# Patient Record
Sex: MALE | Race: WHITE | NOT HISPANIC OR LATINO | Employment: FULL TIME | URBAN - METROPOLITAN AREA
[De-identification: names, ages, dates, MRNs, and addresses within clinical notes are randomized per-mention and may not be internally consistent; named-entity substitution may affect disease eponyms.]

---

## 2024-01-19 ENCOUNTER — HOSPITAL ENCOUNTER (EMERGENCY)
Facility: HOSPITAL | Age: 39
Discharge: HOME | End: 2024-01-19
Attending: EMERGENCY MEDICINE
Payer: MEDICAID

## 2024-01-19 VITALS
DIASTOLIC BLOOD PRESSURE: 103 MMHG | OXYGEN SATURATION: 100 % | BODY MASS INDEX: 28.13 KG/M2 | SYSTOLIC BLOOD PRESSURE: 140 MMHG | RESPIRATION RATE: 18 BRPM | WEIGHT: 158.75 LBS | HEIGHT: 63 IN | TEMPERATURE: 98 F | HEART RATE: 93 BPM

## 2024-01-19 DIAGNOSIS — R31.9 HEMATURIA, UNSPECIFIED TYPE: ICD-10-CM

## 2024-01-19 DIAGNOSIS — R10.9 RIGHT FLANK PAIN: Primary | ICD-10-CM

## 2024-01-19 LAB
ALBUMIN SERPL BCP-MCNC: 4.3 G/DL (ref 3.5–5.2)
ALP SERPL-CCNC: 43 U/L (ref 55–135)
ALT SERPL W/O P-5'-P-CCNC: 18 U/L (ref 10–44)
ANION GAP SERPL CALC-SCNC: 8 MMOL/L (ref 8–16)
AST SERPL-CCNC: 18 U/L (ref 10–40)
BASOPHILS # BLD AUTO: 0.01 K/UL (ref 0–0.2)
BASOPHILS NFR BLD: 0.2 % (ref 0–1.9)
BILIRUB SERPL-MCNC: 0.5 MG/DL (ref 0.1–1)
BILIRUB UR QL STRIP: NEGATIVE
BUN SERPL-MCNC: 10 MG/DL (ref 6–20)
CALCIUM SERPL-MCNC: 9.7 MG/DL (ref 8.7–10.5)
CHLORIDE SERPL-SCNC: 106 MMOL/L (ref 95–110)
CLARITY UR REFRACT.AUTO: CLEAR
CO2 SERPL-SCNC: 26 MMOL/L (ref 23–29)
COLOR UR AUTO: YELLOW
CREAT SERPL-MCNC: 0.8 MG/DL (ref 0.5–1.4)
DIFFERENTIAL METHOD BLD: NORMAL
EOSINOPHIL # BLD AUTO: 0.1 K/UL (ref 0–0.5)
EOSINOPHIL NFR BLD: 1.8 % (ref 0–8)
ERYTHROCYTE [DISTWIDTH] IN BLOOD BY AUTOMATED COUNT: 12.6 % (ref 11.5–14.5)
EST. GFR  (NO RACE VARIABLE): >60 ML/MIN/1.73 M^2
GLUCOSE SERPL-MCNC: 99 MG/DL (ref 70–110)
GLUCOSE UR QL STRIP: NEGATIVE
HCT VFR BLD AUTO: 44 % (ref 40–54)
HGB BLD-MCNC: 15 G/DL (ref 14–18)
HGB UR QL STRIP: ABNORMAL
IMM GRANULOCYTES # BLD AUTO: 0.01 K/UL (ref 0–0.04)
IMM GRANULOCYTES NFR BLD AUTO: 0.2 % (ref 0–0.5)
KETONES UR QL STRIP: NEGATIVE
LEUKOCYTE ESTERASE UR QL STRIP: NEGATIVE
LYMPHOCYTES # BLD AUTO: 1.8 K/UL (ref 1–4.8)
LYMPHOCYTES NFR BLD: 38.8 % (ref 18–48)
MCH RBC QN AUTO: 29.5 PG (ref 27–31)
MCHC RBC AUTO-ENTMCNC: 34.1 G/DL (ref 32–36)
MCV RBC AUTO: 87 FL (ref 82–98)
MONOCYTES # BLD AUTO: 0.4 K/UL (ref 0.3–1)
MONOCYTES NFR BLD: 8.6 % (ref 4–15)
NEUTROPHILS # BLD AUTO: 2.3 K/UL (ref 1.8–7.7)
NEUTROPHILS NFR BLD: 50.4 % (ref 38–73)
NITRITE UR QL STRIP: NEGATIVE
NRBC BLD-RTO: 0 /100 WBC
PH UR STRIP: 7 [PH] (ref 5–8)
PLATELET # BLD AUTO: 220 K/UL (ref 150–450)
PMV BLD AUTO: 10.2 FL (ref 9.2–12.9)
POTASSIUM SERPL-SCNC: 3.6 MMOL/L (ref 3.5–5.1)
PROT SERPL-MCNC: 6.6 G/DL (ref 6–8.4)
PROT UR QL STRIP: NEGATIVE
RBC # BLD AUTO: 5.08 M/UL (ref 4.6–6.2)
SODIUM SERPL-SCNC: 140 MMOL/L (ref 136–145)
SP GR UR STRIP: 1.01 (ref 1–1.03)
URN SPEC COLLECT METH UR: ABNORMAL
UROBILINOGEN UR STRIP-ACNC: NEGATIVE EU/DL
WBC # BLD AUTO: 4.51 K/UL (ref 3.9–12.7)

## 2024-01-19 PROCEDURE — 85025 COMPLETE CBC W/AUTO DIFF WBC: CPT | Mod: ER | Performed by: NURSE PRACTITIONER

## 2024-01-19 PROCEDURE — 99284 EMERGENCY DEPT VISIT MOD MDM: CPT | Mod: 25,ER

## 2024-01-19 PROCEDURE — 81003 URINALYSIS AUTO W/O SCOPE: CPT | Mod: ER | Performed by: NURSE PRACTITIONER

## 2024-01-19 PROCEDURE — 87389 HIV-1 AG W/HIV-1&-2 AB AG IA: CPT | Performed by: EMERGENCY MEDICINE

## 2024-01-19 PROCEDURE — 86803 HEPATITIS C AB TEST: CPT | Performed by: EMERGENCY MEDICINE

## 2024-01-19 PROCEDURE — 80053 COMPREHEN METABOLIC PANEL: CPT | Mod: ER | Performed by: NURSE PRACTITIONER

## 2024-01-19 RX ORDER — NAPROXEN 500 MG/1
500 TABLET ORAL 2 TIMES DAILY WITH MEALS
Qty: 10 TABLET | Refills: 0 | Status: SHIPPED | OUTPATIENT
Start: 2024-01-19 | End: 2024-01-24

## 2024-01-19 NOTE — ED PROVIDER NOTES
Encounter Date: 1/19/2024       History     Chief Complaint   Patient presents with    Hematuria     Dark and light color blood noted in urine, R side flank pain, onset 1 week ago      Patient presents to ER for hematuria, onset 1 week ago.  Associated symptoms include right flank pain.  Symptoms have been intermittent since onset.  He has tried nothing for the symptoms.  Denies trauma/injury, nausea, vomiting, abdominal pain, fever, dysuria, diarrhea, constipation, weakness, fatigue.    The history is provided by the patient.     Review of patient's allergies indicates:   Allergen Reactions    Hydrocodone-acetaminophen      No past medical history on file.  No past surgical history on file.  No family history on file.     Review of Systems   Constitutional:  Negative for chills, fatigue and fever.   HENT:  Negative for ear pain.    Eyes:  Negative for pain.   Respiratory:  Negative for cough and shortness of breath.    Cardiovascular:  Negative for chest pain.   Gastrointestinal:  Negative for abdominal pain, constipation, diarrhea, nausea and vomiting.   Genitourinary:  Positive for flank pain and hematuria. Negative for dysuria.   Musculoskeletal:  Negative for back pain, myalgias and neck pain.   Skin:  Negative for rash.   Neurological:  Negative for weakness and headaches.       Physical Exam     Initial Vitals [01/19/24 1431]   BP Pulse Resp Temp SpO2   (!) 140/103 93 18 97.9 °F (36.6 °C) 100 %      MAP       --         Physical Exam    Nursing note and vitals reviewed.  Constitutional: He appears well-developed and well-nourished. He is not diaphoretic. He is cooperative.  Non-toxic appearance. He does not have a sickly appearance. He does not appear ill. No distress.   HENT:   Head: Normocephalic and atraumatic.   Mouth/Throat: Oropharynx is clear and moist.   Eyes: Conjunctivae are normal.   Neck: Neck supple.   Normal range of motion.  Cardiovascular:  Normal rate, regular rhythm and intact distal pulses.            Pulmonary/Chest: Breath sounds normal. No respiratory distress.   Abdominal: Abdomen is soft. Bowel sounds are normal. He exhibits no distension. There is no abdominal tenderness.   No CVA tenderness.  There is no abdominal tenderness upon light or deep palpation. There is no guarding.   Musculoskeletal:         General: Normal range of motion.      Cervical back: Normal range of motion and neck supple.     Neurological: He is alert and oriented to person, place, and time. He has normal strength. GCS score is 15. GCS eye subscore is 4. GCS verbal subscore is 5. GCS motor subscore is 6.   Skin: Skin is warm and dry. Capillary refill takes less than 2 seconds.         ED Course   Procedures  Labs Reviewed   COMPREHENSIVE METABOLIC PANEL - Abnormal; Notable for the following components:       Result Value    Alkaline Phosphatase 43 (*)     All other components within normal limits    Narrative:     Release to patient->Immediate   URINALYSIS, REFLEX TO URINE CULTURE - Abnormal; Notable for the following components:    Occult Blood UA Trace (*)     All other components within normal limits    Narrative:     Specimen Source->Urine   CBC W/ AUTO DIFFERENTIAL    Narrative:     Release to patient->Immediate   HIV 1 / 2 ANTIBODY   HEPATITIS C ANTIBODY   HEP C VIRUS HOLD SPECIMEN        Results for orders placed or performed during the hospital encounter of 01/19/24   CBC auto differential   Result Value Ref Range    WBC 4.51 3.90 - 12.70 K/uL    RBC 5.08 4.60 - 6.20 M/uL    Hemoglobin 15.0 14.0 - 18.0 g/dL    Hematocrit 44.0 40.0 - 54.0 %    MCV 87 82 - 98 fL    MCH 29.5 27.0 - 31.0 pg    MCHC 34.1 32.0 - 36.0 g/dL    RDW 12.6 11.5 - 14.5 %    Platelets 220 150 - 450 K/uL    MPV 10.2 9.2 - 12.9 fL    Immature Granulocytes 0.2 0.0 - 0.5 %    Gran # (ANC) 2.3 1.8 - 7.7 K/uL    Immature Grans (Abs) 0.01 0.00 - 0.04 K/uL    Lymph # 1.8 1.0 - 4.8 K/uL    Mono # 0.4 0.3 - 1.0 K/uL    Eos # 0.1 0.0 - 0.5 K/uL    Baso #  0.01 0.00 - 0.20 K/uL    nRBC 0 0 /100 WBC    Gran % 50.4 38.0 - 73.0 %    Lymph % 38.8 18.0 - 48.0 %    Mono % 8.6 4.0 - 15.0 %    Eosinophil % 1.8 0.0 - 8.0 %    Basophil % 0.2 0.0 - 1.9 %    Differential Method Automated    Comprehensive metabolic panel   Result Value Ref Range    Sodium 140 136 - 145 mmol/L    Potassium 3.6 3.5 - 5.1 mmol/L    Chloride 106 95 - 110 mmol/L    CO2 26 23 - 29 mmol/L    Glucose 99 70 - 110 mg/dL    BUN 10 6 - 20 mg/dL    Creatinine 0.8 0.5 - 1.4 mg/dL    Calcium 9.7 8.7 - 10.5 mg/dL    Total Protein 6.6 6.0 - 8.4 g/dL    Albumin 4.3 3.5 - 5.2 g/dL    Total Bilirubin 0.5 0.1 - 1.0 mg/dL    Alkaline Phosphatase 43 (L) 55 - 135 U/L    AST 18 10 - 40 U/L    ALT 18 10 - 44 U/L    eGFR >60.0 >60 mL/min/1.73 m^2    Anion Gap 8 8 - 16 mmol/L   Urinalysis, Reflex to Urine Culture Urine, Clean Catch    Specimen: Urine   Result Value Ref Range    Specimen UA Urine, Clean Catch     Color, UA Yellow Yellow, Straw, Corrine    Appearance, UA Clear Clear    pH, UA 7.0 5.0 - 8.0    Specific Gravity, UA 1.015 1.005 - 1.030    Protein, UA Negative Negative    Glucose, UA Negative Negative    Ketones, UA Negative Negative    Bilirubin (UA) Negative Negative    Occult Blood UA Trace (A) Negative    Nitrite, UA Negative Negative    Urobilinogen, UA Negative <2.0 EU/dL    Leukocytes, UA Negative Negative         Imaging Results              CT Renal Stone Study ABD Pelvis WO (Final result)  Result time 01/19/24 15:06:25      Final result by KRUNAL Rivers Sr., MD (01/19/24 15:06:25)                   Impression:      1. The kidneys are normal in appearance.  There is no nephrolithiasis visualized.  2. There are findings characteristic of mesenteric adenitis.  All CT scans at this facility use dose modulation, iterative reconstruction, and/or weight base dosing when appropriate to reduce radiation dose when appropriate to reduce radiation dose to as low as reasonably achievable.      Electronically signed  by: Serg Rivers MD  Date:    01/19/2024  Time:    15:06               Narrative:    EXAMINATION:  CT RENAL STONE STUDY ABD PELVIS WO    CLINICAL HISTORY:  Flank pain, kidney stone suspected;right flank pain;    TECHNIQUE:  Standard abdomen and pelvis CT protocol without IV contrast was performed    COMPARISON:  None    FINDINGS:  Finding: The size of the heart is within normal limits. The lungs are clear. There is no pneumothorax or pleural effusion.    The liver, gallbladder, pancreas, spleen, adrenals, and kidneys are normal in appearance. The ureters and the urinary bladder are normal in appearance.  There is a mild amount of calcification within the prostate.  The appendix and the rest of the gastrointestinal system are normal in appearance.  There is a mild amount of haziness in the mesenteric fat of the abdomen and pelvis.  There are prominent size mesenteric lymph nodes.  There is no free fluid within the abdomen or pelvis. There is no pneumoperitoneum.                                       Medications - No data to display  Medical Decision Making  Amount and/or Complexity of Data Reviewed  Labs: ordered.  Radiology: ordered.    Risk  Prescription drug management.                All results reviewed and discussed with patient he verbalized understanding with no further concerns.  Abdominal examination unremarkable.  No abdominal tenderness upon light or deep palpation.  No nausea or vomiting.  Afebrile.  He is nontoxic/non ill-appearing.  He has in no acute distress.  Discussed outpatient follow-up with urology services, contact information provided on discharge forms and ambulatory referral sent at the time of discharge.  Discussed signs and symptoms to return to ER.  Patient agrees with this plan and voiced no further concerns.  I discussed with patient  that evaluation in the ED does not suggest any emergent or life threatening medical conditions requiring immediate intervention beyond what was  provided in the ED, and I believe patient is safe for discharge. Regardless, an unremarkable evaluation in the ED does not preclude the development or presence of a serious of life threatening condition. As such, patient was instructed to return immediately for any worsening or change in current symptoms.                        Clinical Impression:  Final diagnoses:  [R10.9] Right flank pain (Primary)  [R31.9] Hematuria, unspecified type          ED Disposition Condition    Discharge Stable          ED Prescriptions       Medication Sig Dispense Start Date End Date Auth. Provider    naproxen (NAPROSYN) 500 MG tablet Take 1 tablet (500 mg total) by mouth 2 (two) times daily with meals. for 5 days 10 tablet 1/19/2024 1/24/2024 Adan Lester NP          Follow-up Information       Follow up With Specialties Details Why Contact Info Additional Information    [facility] In 1 day  [id number] [facility] [id number] [phone] Please park on the [address] side and use the Clinic entrance. Check in on the 3rd floor, to the right.    [place]  In 1 day  [address] [facility]  [place] [address]  [phone]       [facility] - Emergency Dept Emergency Medicine  As needed, If symptoms worsen [address]  [facility] [id number] [phone]              Adan Lester NP  01/19/24 [id number]

## 2024-01-19 NOTE — Clinical Note
"Jamie "Jamie" Gilma was seen and treated in our emergency department on 1/19/2024.  He may return to work on 01/21/2024.       If you have any questions or concerns, please don't hesitate to call.      Adan Lester NP"

## 2024-01-19 NOTE — Clinical Note
"Jamie "Jamie" Gilma was seen and treated in our emergency department on 1/19/2024.  He may return to work on 01/20/2024.       If you have any questions or concerns, please don't hesitate to call.      Adan Lester NP"

## 2024-01-20 LAB
HCV AB SERPL QL IA: NEGATIVE
HEP C VIRUS HOLD SPECIMEN: NORMAL
HIV 1+2 AB+HIV1 P24 AG SERPL QL IA: NEGATIVE